# Patient Record
Sex: FEMALE | Race: WHITE | NOT HISPANIC OR LATINO | Employment: FULL TIME | URBAN - METROPOLITAN AREA
[De-identification: names, ages, dates, MRNs, and addresses within clinical notes are randomized per-mention and may not be internally consistent; named-entity substitution may affect disease eponyms.]

---

## 2018-03-02 ENCOUNTER — OFFICE VISIT CONVERTED (OUTPATIENT)
Dept: FAMILY MEDICINE CLINIC | Facility: CLINIC | Age: 28
End: 2018-03-02
Attending: NURSE PRACTITIONER

## 2018-03-02 ENCOUNTER — CONVERSION ENCOUNTER (OUTPATIENT)
Dept: FAMILY MEDICINE CLINIC | Facility: CLINIC | Age: 28
End: 2018-03-02

## 2018-04-12 ENCOUNTER — OFFICE VISIT CONVERTED (OUTPATIENT)
Dept: FAMILY MEDICINE CLINIC | Facility: CLINIC | Age: 28
End: 2018-04-12
Attending: NURSE PRACTITIONER

## 2018-07-03 ENCOUNTER — OFFICE VISIT CONVERTED (OUTPATIENT)
Dept: FAMILY MEDICINE CLINIC | Facility: CLINIC | Age: 28
End: 2018-07-03
Attending: NURSE PRACTITIONER

## 2019-04-29 ENCOUNTER — HOSPITAL ENCOUNTER (OUTPATIENT)
Dept: OTHER | Facility: HOSPITAL | Age: 29
Discharge: HOME | End: 2019-04-29

## 2019-04-29 LAB
ALBUMIN SERPL-MCNC: 4.3 G/DL (ref 3.5–5)
ALBUMIN/GLOB SERPL: 1.2 {RATIO} (ref 1.4–2.6)
ALP SERPL-CCNC: 56 U/L (ref 42–98)
ALT SERPL-CCNC: 11 U/L (ref 10–40)
ANION GAP SERPL CALC-SCNC: 15 MMOL/L (ref 8–19)
AST SERPL-CCNC: 14 U/L (ref 15–50)
BASOPHILS # BLD AUTO: 0.05 10*3/UL (ref 0–0.2)
BASOPHILS NFR BLD AUTO: 0.6 % (ref 0–3)
BILIRUB SERPL-MCNC: 0.26 MG/DL (ref 0.2–1.3)
BUN SERPL-MCNC: 18 MG/DL (ref 5–25)
BUN/CREAT SERPL: 24 {RATIO} (ref 6–20)
CALCIUM SERPL-MCNC: 9.4 MG/DL (ref 8.7–10.4)
CHLORIDE SERPL-SCNC: 103 MMOL/L (ref 99–111)
CHOLEST SERPL-MCNC: 179 MG/DL (ref 107–200)
CHOLEST/HDLC SERPL: 3.1 {RATIO} (ref 3–6)
CONV ABS IMM GRAN: 0.02 10*3/UL (ref 0–0.2)
CONV CO2: 27 MMOL/L (ref 22–32)
CONV IMMATURE GRAN: 0.3 % (ref 0–1.8)
CONV TOTAL PROTEIN: 7.9 G/DL (ref 6.3–8.2)
CREAT UR-MCNC: 0.76 MG/DL (ref 0.5–0.9)
DEPRECATED RDW RBC AUTO: 44.8 FL (ref 36.4–46.3)
EOSINOPHIL # BLD AUTO: 0.09 10*3/UL (ref 0–0.7)
EOSINOPHIL # BLD AUTO: 1.2 % (ref 0–7)
ERYTHROCYTE [DISTWIDTH] IN BLOOD BY AUTOMATED COUNT: 13.6 % (ref 11.7–14.4)
GFR SERPLBLD BASED ON 1.73 SQ M-ARVRAT: >60 ML/MIN/{1.73_M2}
GLOBULIN UR ELPH-MCNC: 3.6 G/DL (ref 2–3.5)
GLUCOSE SERPL-MCNC: 93 MG/DL (ref 65–99)
HBA1C MFR BLD: 12.9 G/DL (ref 12–16)
HCT VFR BLD AUTO: 39.9 % (ref 37–47)
HDLC SERPL-MCNC: 58 MG/DL (ref 40–60)
LDLC SERPL CALC-MCNC: 100 MG/DL (ref 70–100)
LYMPHOCYTES # BLD AUTO: 2.79 10*3/UL (ref 1–5)
MCH RBC QN AUTO: 29.1 PG (ref 27–31)
MCHC RBC AUTO-ENTMCNC: 32.3 G/DL (ref 33–37)
MCV RBC AUTO: 89.9 FL (ref 81–99)
MONOCYTES # BLD AUTO: 0.41 10*3/UL (ref 0.2–1.2)
MONOCYTES NFR BLD AUTO: 5.3 % (ref 3–10)
NEUTROPHILS # BLD AUTO: 4.41 10*3/UL (ref 2–8)
NEUTROPHILS NFR BLD AUTO: 56.7 % (ref 30–85)
NRBC CBCN: 0 % (ref 0–0.7)
OSMOLALITY SERPL CALC.SUM OF ELEC: 292 MOSM/KG (ref 273–304)
PLATELET # BLD AUTO: 257 10*3/UL (ref 130–400)
PMV BLD AUTO: 11.3 FL (ref 9.4–12.3)
POTASSIUM SERPL-SCNC: 4.5 MMOL/L (ref 3.5–5.3)
RBC # BLD AUTO: 4.44 10*6/UL (ref 4.2–5.4)
SODIUM SERPL-SCNC: 140 MMOL/L (ref 135–147)
TRIGL SERPL-MCNC: 103 MG/DL (ref 40–150)
TSH SERPL-ACNC: 1.17 M[IU]/L (ref 0.27–4.2)
VARIANT LYMPHS NFR BLD MANUAL: 35.9 % (ref 20–45)
VLDLC SERPL-MCNC: 21 MG/DL (ref 5–37)
WBC # BLD AUTO: 7.77 10*3/UL (ref 4.8–10.8)

## 2019-06-17 ENCOUNTER — HOSPITAL ENCOUNTER (OUTPATIENT)
Dept: OTHER | Facility: HOSPITAL | Age: 29
Discharge: HOME | End: 2019-06-17
Attending: EMERGENCY MEDICINE

## 2019-06-19 LAB
CONV MUMPS ANTIBODY IGG: 143 AU/ML
HBV SURFACE AB SER-ACNC: >1000 MIU/ML
MEV IGG SER IA-ACNC: 41.6 AU/ML
RUBV IGG SER-ACNC: 467.9 [IU]/ML
VZV IGG SER IA-ACNC: 1602 INDEX

## 2019-06-20 LAB
CONV QUANTIFERON TB GOLD: NEGATIVE
QUANTIFERON CRITERIA: NORMAL
QUANTIFERON MITOGEN VALUE: >10 IU/ML
QUANTIFERON NIL VALUE: 0.03 IU/ML
QUANTIFERON TB1 AG VALUE: 0.02 IU/ML
QUANTIFERON TB2 AG VALUE: 0.02 IU/ML

## 2019-09-26 ENCOUNTER — HOSPITAL ENCOUNTER (OUTPATIENT)
Dept: OTHER | Facility: HOSPITAL | Age: 29
Discharge: HOME | End: 2019-09-26
Attending: OBSTETRICS & GYNECOLOGY

## 2019-09-28 LAB — BACTERIA UR CULT: NORMAL

## 2019-10-24 ENCOUNTER — HOSPITAL ENCOUNTER (OUTPATIENT)
Dept: LAB | Facility: HOSPITAL | Age: 29
Discharge: HOME | End: 2019-10-24

## 2019-10-25 LAB
HBV SURFACE AB SER QL: REACTIVE
VZV IGG SER IA-ACNC: 1077 INDEX

## 2020-02-13 ENCOUNTER — HOSPITAL ENCOUNTER (OUTPATIENT)
Dept: INFUSION THERAPY | Facility: HOSPITAL | Age: 30
Discharge: HOME | End: 2020-02-13
Attending: OBSTETRICS & GYNECOLOGY

## 2020-02-13 LAB
ABO GROUP BLD: NORMAL
BLD GP AB SCN SERPL QL: NORMAL
CONV ABD CONTROL: NORMAL
RH BLD: NORMAL

## 2020-03-12 ENCOUNTER — HOSPITAL ENCOUNTER (OUTPATIENT)
Dept: LABOR AND DELIVERY | Facility: HOSPITAL | Age: 30
Discharge: HOME | End: 2020-03-12
Attending: OBSTETRICS & GYNECOLOGY

## 2020-03-12 LAB
APPEARANCE UR: CLEAR
BILIRUB UR QL: NEGATIVE
COLOR UR: YELLOW
CONV BACTERIA: NEGATIVE
CONV COLLECTION SOURCE (UA): ABNORMAL
CONV UROBILINOGEN IN URINE BY AUTOMATED TEST STRIP: 0.2 {EHRLICHU}/DL (ref 0.1–1)
GLUCOSE UR QL: NEGATIVE MG/DL
HGB UR QL STRIP: NEGATIVE
KETONES UR QL STRIP: NEGATIVE MG/DL
LEUKOCYTE ESTERASE UR QL STRIP: ABNORMAL
NITRITE UR QL STRIP: NEGATIVE
PH UR STRIP.AUTO: 8 [PH] (ref 5–8)
PROT UR QL: NEGATIVE MG/DL
RBC #/AREA URNS HPF: ABNORMAL /[HPF]
SP GR UR: 1.01 (ref 1–1.03)
WBC #/AREA URNS HPF: ABNORMAL /[HPF]

## 2020-03-14 LAB — BACTERIA UR CULT: NORMAL

## 2020-03-19 ENCOUNTER — HOSPITAL ENCOUNTER (OUTPATIENT)
Dept: LABOR AND DELIVERY | Facility: HOSPITAL | Age: 30
Discharge: HOME | End: 2020-03-19
Attending: OBSTETRICS & GYNECOLOGY

## 2020-03-19 LAB
APPEARANCE UR: ABNORMAL
BILIRUB UR QL: NEGATIVE
COLOR UR: YELLOW
CONV BACTERIA: NEGATIVE
CONV COLLECTION SOURCE (UA): ABNORMAL
CONV UROBILINOGEN IN URINE BY AUTOMATED TEST STRIP: 0.2 {EHRLICHU}/DL (ref 0.1–1)
GLUCOSE UR QL: NEGATIVE MG/DL
HGB UR QL STRIP: NEGATIVE
KETONES UR QL STRIP: NEGATIVE MG/DL
LEUKOCYTE ESTERASE UR QL STRIP: ABNORMAL
NITRITE UR QL STRIP: NEGATIVE
PH UR STRIP.AUTO: 7 [PH] (ref 5–8)
PROT UR QL: NEGATIVE MG/DL
RBC #/AREA URNS HPF: ABNORMAL /[HPF]
SP GR UR: 1.01 (ref 1–1.03)
SQUAMOUS SPT QL MICRO: ABNORMAL /[HPF]
WBC #/AREA URNS HPF: ABNORMAL /[HPF]

## 2020-03-21 LAB — BACTERIA UR CULT: NORMAL

## 2020-06-08 ENCOUNTER — OFFICE VISIT CONVERTED (OUTPATIENT)
Dept: FAMILY MEDICINE CLINIC | Facility: CLINIC | Age: 30
End: 2020-06-08
Attending: FAMILY MEDICINE

## 2020-07-13 ENCOUNTER — HOSPITAL ENCOUNTER (OUTPATIENT)
Dept: OTHER | Facility: HOSPITAL | Age: 30
Discharge: HOME | End: 2020-07-13

## 2020-07-13 LAB
ALBUMIN SERPL-MCNC: 4.1 G/DL (ref 3.5–5)
ALBUMIN/GLOB SERPL: 1.1 {RATIO} (ref 1.4–2.6)
ALP SERPL-CCNC: 84 U/L (ref 42–98)
ALT SERPL-CCNC: 11 U/L (ref 10–40)
ANION GAP SERPL CALC-SCNC: 16 MMOL/L (ref 8–19)
AST SERPL-CCNC: 13 U/L (ref 15–50)
BASOPHILS # BLD AUTO: 0.04 10*3/UL (ref 0–0.2)
BASOPHILS NFR BLD AUTO: 0.6 % (ref 0–3)
BILIRUB SERPL-MCNC: <0.15 MG/DL (ref 0.2–1.3)
BUN SERPL-MCNC: 21 MG/DL (ref 5–25)
BUN/CREAT SERPL: 29 {RATIO} (ref 6–20)
CALCIUM SERPL-MCNC: 9.4 MG/DL (ref 8.7–10.4)
CHLORIDE SERPL-SCNC: 106 MMOL/L (ref 99–111)
CHOLEST SERPL-MCNC: 172 MG/DL (ref 107–200)
CHOLEST/HDLC SERPL: 2.6 {RATIO} (ref 3–6)
CONV ABS IMM GRAN: 0.02 10*3/UL (ref 0–0.2)
CONV CO2: 23 MMOL/L (ref 22–32)
CONV IMMATURE GRAN: 0.3 % (ref 0–1.8)
CONV TOTAL PROTEIN: 7.7 G/DL (ref 6.3–8.2)
CREAT UR-MCNC: 0.72 MG/DL (ref 0.5–0.9)
DEPRECATED RDW RBC AUTO: 47.7 FL (ref 36.4–46.3)
EOSINOPHIL # BLD AUTO: 0.26 10*3/UL (ref 0–0.7)
EOSINOPHIL # BLD AUTO: 3.6 % (ref 0–7)
ERYTHROCYTE [DISTWIDTH] IN BLOOD BY AUTOMATED COUNT: 14.9 % (ref 11.7–14.4)
GFR SERPLBLD BASED ON 1.73 SQ M-ARVRAT: >60 ML/MIN/{1.73_M2}
GLOBULIN UR ELPH-MCNC: 3.6 G/DL (ref 2–3.5)
GLUCOSE SERPL-MCNC: 90 MG/DL (ref 65–99)
HCT VFR BLD AUTO: 36.8 % (ref 37–47)
HDLC SERPL-MCNC: 65 MG/DL (ref 40–60)
HGB BLD-MCNC: 11.3 G/DL (ref 12–16)
LDLC SERPL CALC-MCNC: 90 MG/DL (ref 70–100)
LYMPHOCYTES # BLD AUTO: 2.52 10*3/UL (ref 1–5)
LYMPHOCYTES NFR BLD AUTO: 35.1 % (ref 20–45)
MCH RBC QN AUTO: 26.8 PG (ref 27–31)
MCHC RBC AUTO-ENTMCNC: 30.7 G/DL (ref 33–37)
MCV RBC AUTO: 87.4 FL (ref 81–99)
MONOCYTES # BLD AUTO: 0.44 10*3/UL (ref 0.2–1.2)
MONOCYTES NFR BLD AUTO: 6.1 % (ref 3–10)
NEUTROPHILS # BLD AUTO: 3.9 10*3/UL (ref 2–8)
NEUTROPHILS NFR BLD AUTO: 54.3 % (ref 30–85)
NRBC CBCN: 0 % (ref 0–0.7)
OSMOLALITY SERPL CALC.SUM OF ELEC: 295 MOSM/KG (ref 273–304)
PLATELET # BLD AUTO: 256 10*3/UL (ref 130–400)
PMV BLD AUTO: 11.2 FL (ref 9.4–12.3)
POTASSIUM SERPL-SCNC: 4.2 MMOL/L (ref 3.5–5.3)
RBC # BLD AUTO: 4.21 10*6/UL (ref 4.2–5.4)
SODIUM SERPL-SCNC: 141 MMOL/L (ref 135–147)
TRIGL SERPL-MCNC: 85 MG/DL (ref 40–150)
TSH SERPL-ACNC: 2.39 M[IU]/L (ref 0.27–4.2)
VLDLC SERPL-MCNC: 17 MG/DL (ref 5–37)
WBC # BLD AUTO: 7.18 10*3/UL (ref 4.8–10.8)

## 2020-09-02 ENCOUNTER — HOSPITAL ENCOUNTER (OUTPATIENT)
Dept: OTHER | Facility: HOSPITAL | Age: 30
Discharge: HOME | End: 2020-09-02
Attending: NURSE PRACTITIONER

## 2020-09-02 LAB
APPEARANCE UR: CLEAR
BILIRUB UR QL: NEGATIVE
COLOR UR: YELLOW
CONV BACTERIA: NEGATIVE
CONV COLLECTION SOURCE (UA): ABNORMAL
CONV UROBILINOGEN IN URINE BY AUTOMATED TEST STRIP: 0.2 {EHRLICHU}/DL (ref 0.1–1)
GLUCOSE UR QL: NEGATIVE MG/DL
HGB UR QL STRIP: NEGATIVE
KETONES UR QL STRIP: NEGATIVE MG/DL
LEUKOCYTE ESTERASE UR QL STRIP: ABNORMAL
NITRITE UR QL STRIP: NEGATIVE
PH UR STRIP.AUTO: 6.5 [PH] (ref 5–8)
PROT UR QL: NEGATIVE MG/DL
RBC #/AREA URNS HPF: ABNORMAL /[HPF]
SP GR UR: 1.01 (ref 1–1.03)
SQUAMOUS SPT QL MICRO: ABNORMAL /[HPF]
WBC #/AREA URNS HPF: ABNORMAL /[HPF]

## 2020-09-04 LAB — BACTERIA UR CULT: NORMAL

## 2020-11-18 ENCOUNTER — OFFICE VISIT CONVERTED (OUTPATIENT)
Dept: FAMILY MEDICINE CLINIC | Facility: CLINIC | Age: 30
End: 2020-11-18
Attending: NURSE PRACTITIONER

## 2020-11-18 ENCOUNTER — HOSPITAL ENCOUNTER (OUTPATIENT)
Dept: FAMILY MEDICINE CLINIC | Facility: CLINIC | Age: 30
Discharge: HOME | End: 2020-11-18
Attending: NURSE PRACTITIONER

## 2020-11-18 ENCOUNTER — CONVERSION ENCOUNTER (OUTPATIENT)
Dept: FAMILY MEDICINE CLINIC | Facility: CLINIC | Age: 30
End: 2020-11-18

## 2020-11-21 LAB — SARS-COV-2 RNA SPEC QL NAA+PROBE: DETECTED

## 2020-12-10 ENCOUNTER — HOSPITAL ENCOUNTER (OUTPATIENT)
Dept: OTHER | Facility: HOSPITAL | Age: 30
Discharge: HOME | End: 2020-12-10
Attending: INTERNAL MEDICINE

## 2020-12-10 LAB
CRP SERPL HS-MCNC: 1.17 MG/DL (ref 0–0.5)
D DIMER PPP FEU-MCNC: 0.94 MG/L (ref 0–0.59)
ERYTHROCYTE [SEDIMENTATION RATE] IN BLOOD: 43 MM/H (ref 0–20)

## 2020-12-15 ENCOUNTER — OFFICE VISIT CONVERTED (OUTPATIENT)
Dept: PULMONOLOGY | Facility: CLINIC | Age: 30
End: 2020-12-15
Attending: INTERNAL MEDICINE

## 2021-01-04 ENCOUNTER — HOSPITAL ENCOUNTER (OUTPATIENT)
Dept: OTHER | Facility: HOSPITAL | Age: 31
Discharge: HOME | End: 2021-01-04
Attending: INTERNAL MEDICINE

## 2021-01-21 ENCOUNTER — HOSPITAL ENCOUNTER (OUTPATIENT)
Dept: OTHER | Facility: HOSPITAL | Age: 31
Discharge: HOME | End: 2021-01-21
Attending: INTERNAL MEDICINE

## 2021-01-21 LAB
CRP SERPL HS-MCNC: 0.72 MG/DL (ref 0–0.5)
D DIMER PPP FEU-MCNC: 1.89 MG/L (ref 0–0.59)
ERYTHROCYTE [SEDIMENTATION RATE] IN BLOOD: 39 MM/H (ref 0–20)
PROCALCITONIN SERPL-MCNC: 0.04 NG/ML (ref 0–4)

## 2021-01-25 ENCOUNTER — OFFICE VISIT CONVERTED (OUTPATIENT)
Dept: PULMONOLOGY | Facility: CLINIC | Age: 31
End: 2021-01-25
Attending: INTERNAL MEDICINE

## 2021-05-09 VITALS
SYSTOLIC BLOOD PRESSURE: 124 MMHG | HEART RATE: 110 BPM | OXYGEN SATURATION: 98 % | WEIGHT: 191 LBS | DIASTOLIC BLOOD PRESSURE: 84 MMHG

## 2021-05-09 VITALS
BODY MASS INDEX: 32.96 KG/M2 | HEIGHT: 63 IN | WEIGHT: 186 LBS | TEMPERATURE: 98.1 F | DIASTOLIC BLOOD PRESSURE: 70 MMHG | OXYGEN SATURATION: 99 % | SYSTOLIC BLOOD PRESSURE: 110 MMHG | HEART RATE: 71 BPM

## 2021-05-09 VITALS
DIASTOLIC BLOOD PRESSURE: 70 MMHG | SYSTOLIC BLOOD PRESSURE: 108 MMHG | HEART RATE: 88 BPM | HEIGHT: 63 IN | BODY MASS INDEX: 34.04 KG/M2 | OXYGEN SATURATION: 98 % | WEIGHT: 192.12 LBS | TEMPERATURE: 96.8 F

## 2021-05-09 VITALS
HEART RATE: 106 BPM | HEIGHT: 61 IN | DIASTOLIC BLOOD PRESSURE: 80 MMHG | TEMPERATURE: 98 F | SYSTOLIC BLOOD PRESSURE: 130 MMHG | BODY MASS INDEX: 33.42 KG/M2 | WEIGHT: 177 LBS | OXYGEN SATURATION: 97 %

## 2021-05-09 VITALS — TEMPERATURE: 97.6 F | HEART RATE: 114 BPM | OXYGEN SATURATION: 98 %

## 2021-05-28 NOTE — PROGRESS NOTES
Patient: JANELL ROSE     Acct: [id number]     Report: #[id number]  UNIT #: [id number]     : 1990    Encounter Date:12/15/2020  PRIMARY CARE: MARGARITA DOZIER  ***Signed***  --------------------------------------------------------------------------------------------------------------------  History of Present Illness            Chief Complaint: New Patient- Covid 19            Janell Rose is presenting for evaluation via Video and Audio     conferencing. Verbal consent obtained via Video and Audio before beginning the     visit.            The following staff were present during the visit: Dr. Pavel Jacome, [facility]            The patient is a 30 year old female with a history of pregnancy and is 16 weeks     pregnant. 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 She recently was     diagnosed with COVID19 pneumonia on . She started having symptoms on .    At that time she had CT scan of the chest with severe shortness of breath, cough    and fever up to 105 degree Fahrenheit.  CT scan showed bilateral multiple focal     pneumonia concerning for COVID19 infection. At that time, she was treated with     antibiotics and steroids. She completed the course and is back to work, but is     having to use a rescue inhaler multiple times a day. She recently had D. Dimer     checked which was 1.28. Because of that she was started on a baby dose aspirin     and was asked to follow up with the pulmonary service. She has shortness of     breath with minimal exertion.  No leg swelling, fever, chills, nausea, vomiting,    chest pain or chest tightness. I have checked CRP, ESR and D. Dimer. CRP and ESR    are high.  D. Dimer is high, though improved compared to last time. It is 0.94     and was1.28 three weeks ago. She does use albuterol multiple times a day while     she is at work.  She can work for a few minutes and then is short of breath with    activity. She has a dry  cough, it does not produce much phlegm. She has no     changes in weight or appetite.  She does not have significant leg swelling. She     does not have dizziness or headache.  She continues to follow up with an OB     service in [place] given the high risk pregnancy.            Physical exam on zoom visit:  The patient is a normal conversationalist and is a    pleasant female in no acute distress.  Other physical exam could not be done on     zoom visit.            The patient's D. Dimer is 0.94, it was 1.28 three weeks ago.  ESR and CRP are     slightly increased.                       [facility]                PACS RADIOLOGY REPORT            Patient: MUMTAZ ROSE   Acct: #[id number]   Report: #[id number]            UNIT #: [id number]    DOS: 20 1723      INSURANCE:[employer]   ORDER #:[id number]      LOCATION:[facility]     : 1990            PROVIDERS      ADMITTING:     ATTENDING:       FAMILY:  MARGARITA DOZIER   ORDERING:  *JANELLE GARCIA         OTHER:    DICTATING:  RADHA MAN MD            REQ #:[id number]   EXAM:CTPA - CT PULMONARY ANGIOGRAM      REASON FOR EXAM:  Shortness of Breath      REASON FOR VISIT:  ANNEX--+COVID/SOA/FEVER/+ PREGNANCY            *******Signed******         PROCEDURE:   CT PULMONARY ANGIOGRAM             COMPARISON:   None.             INDICATIONS:   Shortness of Breath             TECHNIQUE:   After obtaining the patient's consent, CT images were obtained with    non-ionic       intravenous contrast material.               PROTOCOL:     Pulmonary embolism imaging protocol performed                RADIATION:     DLP: 855 mGy*cm          Automated exposure control was utilized to minimize radiation dose.       CONTRAST:   100 cc Isovue 370 I.V.      LABS:     eGFR:  > 60 ml/min/1.73m2             FINDINGS:         No evidence for pulmonary embolus or aortic injury.  A mildly  prominent right     hilar lymph node       measures 1.7 cm.  No acute findings in the included upper abdomen.  There are     multifocal patchy       areas of opacity in both lungs, the largest is in the right lower lobe and     measures up to 6.6 cm.        Opacities are predominately peripheral.  No aggressive appearing bone change.               CONCLUSION:   No evidence for pulmonary embolus.             Findings most in keeping with bilateral pneumonia, including COVID-19.              RADHA MAN MD             Electronically Signed and Approved By: RADHA MAN MD on 11/23/2020 at 19:26                               Until signed, this is an unconfirmed preliminary report that may contain      errors and is subject to change.                                              JULIA:      D:11/23/20 1926                         Past Med History      Smoking Status- never smoker            Flu vaccine- current            Pneumonia vaccine- not current      Overview of Symptoms      Complaints- SOA with ambulation            Allergies and Medications      Allergies:        Coded Allergies:             NO KNOWN ALLERGIES (Unverified , 12/15/20)      Medications      Aspirin Chew (Aspirin Baby) 81 Mg Tab.chew      81 MG PO QDAY, #30 TAB.CHEW 0 Refills         Reported         12/15/20       Multivit/Min/Fol Ac/Iron/Pren (Prenatal Complete*) 1 Each Tablet      1 TAB PO QDAY, TAB         Reported         2/13/20       Famotidine (Pepcid AC) 10 Mg Tablet      10 MG PO BID, TAB         Reported         2/13/20            Past Medical,Surg,Family Hx      Past Surgical History:  None            Social History      Smoking status:  Never smoker            Review of Systems      General:  Denies: Appetite Change, Fatigue, Fever, Night Sweats, Weight Gain,     Weight Loss      ENT:  Denies Headache, Denies Hearing Loss, Denies Hoarseness, Denies Sore     Throat      Eye:  Denies Blurred Vision, Denies Corrective Lenses, Denies  Diplopia, Denies     Vision Changes      Cardiovascular:  Denies Chest Pain, Denies Palpitations      Respiratory:  Admits: Shortness of Air;          Denies: Cough, Coughing Blood, Productive Cough, Wheezing      Gastrointestinal:  Denies Bloody Stools, Denies Constipation, Denies Diarrhea,     Denies Nausea/Vomiting, Denies Problem Swallowing, Denies Unable to Control     Bowels      Genitourinary:  Denies Blood in Urine, Denies Incontinence, Denies Painful     Urination      Musculoskeletal:  Denies Back Pain, Denies Muscle Pain, Denies Painful Joints      Integumentary:  Denies Itching, Denies Lesions, Denies Rash      Neurologic:  Denies Dizziness, Denies Numbness\Tingling, Denies Seizures      Psychiatric:  Denies Anxiety, Denies Depression      Endocrine:  Denies Cold Intolerance, Denies Heat Intolerance      Hematologic/Lymphatic:  Denies Bruising, Denies Bleeding, Denies Enlarged Lymph     Nodes            Plan and Patient Instructions      Ambulatory Assessment/Plan:        COVID-19 - U07.1            Notes      New Medications      * Aspirin Chew (Aspirin Baby) 81 MG TAB.CHEW: 81 MG PO QDAY #30      * Fluticasone (Flovent HFA) 220 MCG INHALER: 2 PUFF INH RTBID #1      New Diagnostics      * Chest 2 View, Week         Dx: COVID-19 - U07.1      * CRP HIGH SENSITIVE, 2 Week         Dx: COVID-19 - U07.1      * Sed Rate, Routine         Dx: COVID-19 - U07.1      * D-Dimer Quantitative, 2 Week         Dx: COVID-19 - U07.1      * Procalcitonin, Routine         Dx: COVID-19 - U07.1      Plan      PLAN:  The patient is a 30 year old female who is 15 weeks pregnant with a high     D. Dimer concerning for a possible persistent infection with COVID19 infection.      1. Post COVID19 infection exertional dyspnea.  Check chest x-ray in two weeks.     Check ESR and CRP and procalcitonin.  Check D-dimer in two weeks.      2.  I will start her on Flovent 220 mcg two puffs twice a day for now. If her     symptoms are not  improving, I will start her on Breztri inhaler twice a day.     Given her pregnancy, I will try to hold off on oral steroids at this time.        3. If her symptoms are persistent, she might need CT scan of the chest with     contrast. Hold off on any anticoagulation since D. Dimer is improving and her     symptoms are improving.        4. Follow up in 2-3 weeks, earlier if needed.      Instructions      * Chronic conditions reviewed and taken into consideration for today's treatment      plan.      * Patient instructed to seek medical attention urgently for new or worsening       symptoms.      * Patient was educated/instructed on their diagnosis, treatment and medications       prior to discharge from the Video and Audio visit today.            Electronically signed by Pavel Jacome  01/20/2021 12:34       Disclaimer: Converted document may not contain table formatting or lab diagrams. Please see [employer] for the authenticated document.

## 2021-05-28 NOTE — PROGRESS NOTES
Patient: JANELL ROSE     Acct: [id number]     Report: #[id number]  UNIT #: [id number]     : 1990    Encounter Date:2021  PRIMARY CARE: MARGARITA DOZIER  ***Signed***  --------------------------------------------------------------------------------------------------------------------  History of Present Illness            Chief Complaint: 3 week f/u, results            Janell Rose is presenting for evaluation via Telehealth visit.     Verbal consent obtained before beginning visit.            PAST MEDICAL HISTORY/OVERVIEW OF PATIENT SYMPTOMS            Hx: Covid-19            Smoking Status- never smoker            Flu vaccine- current            Pneumonia vaccine- not current            Symptoms: None            Provider spent 12 minutes with the patient during telehealth visit.            The following staff were present during this visit: Dr. Pavel Jacome, [place]     [facility]            The patient is a 30 year old female who is 22 weeks pregnant with high D-dimer     and concern for persistent infection of COVID19 in the past.  She had chest x-    ray and blood work and D. Dimer came back high again at 1.96 which is higher     than previously.  She had chest x-ray which showed clearing of her infiltrates.     ESR and CRP were slowly improving. She has no fever, chills, nausea or vomiting.     We had just ordered Flovent last time and it helped last time.  She uses two     puffs twice a day and it is helping.  She has rarely needed any rescue inhaler     and is using rescue inhaler around once a week.  Her pregnancy has been okay.      She has not had any blood work through OB service for the next few weeks.            Physical exam deferred due to TeleHealth visit.              The patient's D-dimer is 1.96 which is trending up.                       [facility]                PACS RADIOLOGY REPORT             Patient: MUMTAZ ROSE   Acct: #[id number]   Report: #[id number]            UNIT #: [id number]    DOS: 21 0954      INSURANCE:[employer] [facility]   ORDER #:[id number]      LOCATION:[facility]     : 1990            PROVIDERS      ADMITTING:     ATTENDING: Dorothea Jacome      FAMILY:  NONE,MD   ORDERING:  Dorothea Jacome         OTHER:    DICTATING:  Aidan Cordon DO            REQ #:[id number]   EXAM:CXR2 - CHEST 2V AP PA LAT      REASON FOR EXAM:        REASON FOR VISIT:  COVID 19            *******Signed******         PROCEDURE:   CHEST AP/PA AND LATERAL             COMPARISON:   [facility], , CHEST AP/PA 1 VIEW, 2020,     16:17.             INDICATIONS:   COVID 19, history of COVID-19 in early 2020, patient is     currently 20 weeks       pregnant.  Still gets short of breath.             FINDINGS:   Previously noted airspace opacities have resolved.  The lungs are     radiographically clear.       Cardiac, hilar, and mediastinal silhouettes are unremarkable. No pneumothorax or    pleural effusion.       Bony structures are intact.  The trachea is midline.              CONCLUSION:   No active cardiopulmonary disease.              AIDAN CORDON DO             Electronically Signed and Approved By: AIDAN CORDON DO on 2021 at 11:01                                  Until signed, this is an unconfirmed preliminary report that may contain      errors and is subject to change.                                              LUZMAAN:      D:21 1101                   Allergies and Medications      Allergies:        Coded Allergies:             NO KNOWN ALLERGIES (Unverified , 21)      Medications    Last Reconciled on 21 13:48 by DOROTHEA JACOME MD      Fluticasone (Flovent HFA) 220 Mcg Inhaler      2 PUFF INH RTBID, #1 INH 3 Refills         Prov: Dorothea Jacome         12/15/20       Aspirin Chew (Aspirin Baby) 81 Mg Tab.chew      81 MG PO  QDAY, #30 TAB.CHEW 0 Refills         Reported         12/15/20       Multivit/Min/Fol Ac/Iron/Pren (Prenatal Complete*) 1 Each Tablet      1 TAB PO QDAY, TAB         Reported         2/13/20       Famotidine (Pepcid AC) 10 Mg Tablet      10 MG PO BID, TAB         Reported         2/13/20            Plan      Orders:  Phone Eval 11-20 mi [id number]      Instructions      * Chronic conditions reviewed and taken in consideration for today's treatment       plan.      * Plan Of Care: ()      * Patient instructed to seek medical attention urgently for new or worsening       symptoms.      * Patient was educated/instructed on their diagnosis, treatment and medications       today.      * Recommend self monitoring. Instructions given.      * Recommend self quarantine for 14 days.      * Recommend self quarantine until without fever for 72 hours without using fever       reducing medications.      * Recommends over the counter medications for symptom management.            PLAN:  The patient is a 30 year old female who is 22 weeks pregnant with a high     D-Dimer post COVID19 infection on chest x-ray.  ESR and CRP is improved.        1.  D-dimer is high. Continue to monitor closely.  She does not have symptoms of     chest pain, shortness of breath or leg swelling. I will hold off on any imaging     for now.        2.  Check a repeat CBC, CMP and D. Dimer in two weeks. I advised her to go down     on Flovent to one puff twice a day as her symptoms of shortness of breath are     significantly improving.        3.  Pregnancy monitoring closely through her OB service in [place].        4. Follow up in 4-6 weeks, earlier if needed.            Electronically signed by Pavel Jacome  01/29/2021 12:23       Disclaimer: Converted document may not contain table formatting or lab diagrams. Please see [employer] for the authenticated document.

## 2021-10-29 ENCOUNTER — IMMUNIZATION (OUTPATIENT)
Dept: VACCINE CLINIC | Facility: HOSPITAL | Age: 31
End: 2021-10-29

## 2021-10-29 PROCEDURE — 0002A: CPT | Performed by: INTERNAL MEDICINE

## 2021-10-29 PROCEDURE — 91300 HC SARSCOV02 VAC 30MCG/0.3ML IM: CPT | Performed by: INTERNAL MEDICINE

## 2021-10-29 PROCEDURE — 0001A: CPT | Performed by: INTERNAL MEDICINE
